# Patient Record
Sex: FEMALE | Race: WHITE
[De-identification: names, ages, dates, MRNs, and addresses within clinical notes are randomized per-mention and may not be internally consistent; named-entity substitution may affect disease eponyms.]

---

## 2018-10-02 ENCOUNTER — HOSPITAL ENCOUNTER (INPATIENT)
Dept: HOSPITAL 47 - 2ORMAIN | Age: 63
LOS: 1 days | Discharge: HOME HEALTH | DRG: 470 | End: 2018-10-03
Attending: ORTHOPAEDIC SURGERY | Admitting: ORTHOPAEDIC SURGERY
Payer: COMMERCIAL

## 2018-10-02 VITALS — BODY MASS INDEX: 29.6 KG/M2

## 2018-10-02 DIAGNOSIS — Z88.8: ICD-10-CM

## 2018-10-02 DIAGNOSIS — Z79.899: ICD-10-CM

## 2018-10-02 DIAGNOSIS — M16.12: Primary | ICD-10-CM

## 2018-10-02 DIAGNOSIS — F17.210: ICD-10-CM

## 2018-10-02 DIAGNOSIS — Z87.01: ICD-10-CM

## 2018-10-02 DIAGNOSIS — D62: ICD-10-CM

## 2018-10-02 DIAGNOSIS — Z91.013: ICD-10-CM

## 2018-10-02 DIAGNOSIS — Z80.9: ICD-10-CM

## 2018-10-02 PROCEDURE — 88300 SURGICAL PATH GROSS: CPT

## 2018-10-02 PROCEDURE — 86900 BLOOD TYPING SEROLOGIC ABO: CPT

## 2018-10-02 PROCEDURE — 80053 COMPREHEN METABOLIC PANEL: CPT

## 2018-10-02 PROCEDURE — 86891 AUTOLOGOUS BLOOD OP SALVAGE: CPT

## 2018-10-02 PROCEDURE — 86901 BLOOD TYPING SEROLOGIC RH(D): CPT

## 2018-10-02 PROCEDURE — 85025 COMPLETE CBC W/AUTO DIFF WBC: CPT

## 2018-10-02 PROCEDURE — 73501 X-RAY EXAM HIP UNI 1 VIEW: CPT

## 2018-10-02 PROCEDURE — 86850 RBC ANTIBODY SCREEN: CPT

## 2018-10-02 PROCEDURE — 0SRB06A REPLACEMENT OF LEFT HIP JOINT WITH OXIDIZED ZIRCONIUM ON POLYETHYLENE SYNTHETIC SUBSTITUTE, UNCEMENTED, OPEN APPROACH: ICD-10-PCS

## 2018-10-02 RX ADMIN — POTASSIUM CHLORIDE SCH MLS: 14.9 INJECTION, SOLUTION INTRAVENOUS at 06:02

## 2018-10-02 RX ADMIN — ASPIRIN 325 MG ORAL TABLET SCH MG: 325 PILL ORAL at 20:40

## 2018-10-02 RX ADMIN — HYDROCODONE BITARTRATE AND ACETAMINOPHEN PRN EACH: 5; 325 TABLET ORAL at 12:15

## 2018-10-02 RX ADMIN — CEFAZOLIN SCH: 330 INJECTION, POWDER, FOR SOLUTION INTRAMUSCULAR; INTRAVENOUS at 13:40

## 2018-10-02 RX ADMIN — HYDROCODONE BITARTRATE AND ACETAMINOPHEN PRN EACH: 5; 325 TABLET ORAL at 17:36

## 2018-10-02 RX ADMIN — ASPIRIN 325 MG ORAL TABLET SCH: 325 PILL ORAL at 13:40

## 2018-10-02 RX ADMIN — CEFAZOLIN SCH MLS/HR: 330 INJECTION, POWDER, FOR SOLUTION INTRAMUSCULAR; INTRAVENOUS at 18:49

## 2018-10-02 RX ADMIN — CEFAZOLIN SCH GM: 10 INJECTION, POWDER, FOR SOLUTION INTRAVENOUS at 15:11

## 2018-10-02 NOTE — FL
Fluoroscopy

 

HISTORY: Left hip arthroplasty

 

38 seconds fluoroscopy time supplied to the referring clinician.  2 intraoperative C-arm images docum
ent the procedure. See dictated report from orthopedic surgery.

## 2018-10-02 NOTE — XR
Limited left hip

 

HISTORY: Left hip arthroplasty

 

2 intraoperative C-arm images document the procedure.

## 2018-10-02 NOTE — P.OP
Date of Procedure: 10/02/18


Preoperative Diagnosis: 


Severe osteoarthritis left hip


Postoperative Diagnosis: 


Severe osteoarthritis left hip


Procedure(s) Performed: 


Left total hip arthroplasty with a direct anterior approach


Implants: 


Smith and nephew Polarstem size 2 standard


Smith & Nephew R3, 3 hole acetabular shell, 48 mm


Smith & Nephew reflection 6.5 mm cancellus screw, 20 mm 2


Smith & Nephew R3, XLPE 20 acetabular liner


Smith & Nephew Oxinium femoral head 32 m, +0


All components were press-fit.


The articulation is Oxinium on polyethylene.


Anesthesia: spinal


Surgeon: Yasmani Salamanca


Assistant #1: Fatuma Mcdonnell


Estimated Blood Loss (ml): 200 (60 mL returned with Cell Saver)


Pathology: other (Femoral head)


Condition: stable


Disposition: PACU


Indications for Procedure: 


After failure of conservative treatment we discussed the surgical and 

nonsurgical treatment options at length.  Patient wishes to proceed with a 

total hip arthroplasty with a direct anterior approach.  Complications specific 

to this procedure were discussed at length, including but not limited to 

infection, leg length discrepancy, dislocation, and nerve injury.  Patient is 

aware of all these complications and informed consent was obtained


Operative Findings: 


The operative findings are consistent with severe osteoarthritis of the left hip


Description of Procedure: 


Patient was seen and evaluated in the preoperative area, consent was reviewed, 

and the surgical site was marked with a skin marker.  Patient was then brought 

to the operating room and given prophylactic antibiotics intravenously.  1 g of 

Tranexamic acid was also given.  A spinal anesthetic was administered by the 

anesthesia department.   The patient was then placed on the [place] table with the 

bony prominences well-padded.  The hip area was then prepped and draped in 

usual sterile fashion.





A universal timeout was then performed, which confirmed the patient's name, 

surgical site, ALLERGIES, and procedure being performed.  Next the incision 

site was located at 1 cm distal and 1 cm lateral to the anterior superior iliac 

spine.  The skin and subcutaneous tissues were sharply incised.  Incision was 

carefully dissected down to the fascia overlying the tensor fascia dilma muscle.

  This fascia was then incised in line with the incision.  Next, using blunt 

finger dissection, the tensor fascia dilma muscle was dissected off its 

investing fascia.  The muscle was then carefully retracted laterally with a 

cobra retractor over the lateral neck of the femur.  Next, the circumflex 

vessels were identified and cauterized using the AquaMantis device.  The 

anterior hip capsule was then exposed.  The capsule was then opened and an 

inverted T fashion.  Cobra retractors were then placed intracapsularly.  The 

proximal femur was then visualized.





The femoral neck was then osteotomized appropriate level above the lesser 

trochanter.  Small amount of traction was placed with the [place] table.  A small 

wedge of bone was then removed from the remaining femoral head.  Next, using a 

corkscrew femoral head was easily removed from the acetabulum.  On gross visual 

inspection, the femoral head had complete loss of articular cartilage in 

multiple periarticular osteophytes.  Attention was then turned to the 

acetabulum.





the acetabulum was exposed and any remaining labrum was excised.  Sequential 

reaming of the acetabulum was performed using fluoroscopic guidance.  When the 

appropriate size was reached, a trial was then placed.  The position and fit of 

the trial was checked with fluoroscopy.  The trial was then removed.  Then, 

using fluoroscopic guidance, the final implant was impacted at 20 of 

anteversion and 40 of abduction, and fully seated in the acetabulum.  2 screws 

were then placed in the acetabulum.  Again fluoroscopy was used to check 

position of the screws.  Next, the liner was then impacted, with a 20 elevated 

liner located in the anterior superior quadrant.  Component locking was 

confirmed.





Attention was then directed to the femur.  With the aid of the [place] table, the 

femur was externally rotated to approximately 130, extended, and abducted 

under the opposite leg.  A side hook was then placed under the proximal femur, 

and the side hook elevator was used to elevate the proximal femur.  Retractors 

were then placed.  A capsular release was performed, as well as a release of 

the conjoined tendon, which afforded excellent visualization of the proximal 

femur.  Next, a box osteotome was used to lateralize the proximal femur.  A 

 was then used to locate the femoral canal.  Sequential broaching 

was then performed with appropriate size which afforded excellent fixation in 

the proximal femur.  A trial was then placed with appropriate head and neck, 

and the hip was gently reduced with the aid of the [place] table.  Fluoroscopy was 

then used to check position of the components, as well as to ensure equal leg 

lengths.  The hip was then gently dislocated and the trials were then removed.  

Final implants were then impacted and the hip was again reduced.  Final 

fluoroscopic x-rays confirmed that the components were in anatomic position, as 

well as equal leg lengths.  The hip was also taken through range of motion, and 

found to be stable.





The hip was then copiously irrigated with antibiotic solution with pulsatile 

lavage.  The hip was then irrigated with Irrisept solution.  The soft tissues 

were then injected with a ropivacaine solution, which consisted of 246.25 mg of 

ropivacaine, 0.5 mg of epinephrine, 30 mg of Toradol, 80 g of clonidine, and 

48.45 mL of sterile water, for a total of 100 mL of fluid injected.  A second 

dose of 1 g of Tranexamic acid was also given.





the fascia was then closed with 2-0 strata fix suture.  The subcutaneous tissue 

was closed with 3-0 Vicryl.  The subcuticular tissue was closed with 3-0 strata 

fix suture.  The skin was then closed with Dermabond glue and a sterile silver 

dressing.  The patient was then transferred to the recovery room in stable 

condition.





The assistant  ALANIS Hartmann was required due to the complexity of surgery, 

and the need for skilled surgical assistant for positioning, draping, exposure, 

retraction, and closure of the wound.

## 2018-10-02 NOTE — XR
EXAMINATION TYPE: XR Hip Limited LT

 

DATE OF EXAM: 10/2/2018

 

COMPARISON: NONE

 

HISTORY: 63-year-old female status post hip surgery, assess surgical alignment

 

TECHNIQUE: AP portable view

 

FINDINGS: 

Image shows placement of left total hip arthroplasty. Both acetabular cup and femoral short stem comp
onents of the prosthesis appear well seated without periprosthetic fracture. Alignment grossly anatom
ic. Scattered foci of soft tissue air relating to recent operation.

 

 

 

IMPRESSION: 

Uncomplicated postoperative appearance left total hip arthroplasty.

## 2018-10-02 NOTE — P.CONS
History of Present Illness





- Reason for Consult


Consult date: 10/02/18


Left hip arthroplasty, medical management


Requesting physician: Yasmani Salamanca





- Chief Complaint


L hip arthroplasty, medical management





- History of Present Illness





63-year-old female with PMH of severe left hip osteoarthritis is admitted at 

[facility] after left total hip arthroplasty with a direct anterior 

approach.  Patient reports having osteoarthritis of the left hip for multiple 

years.  She complains of catching and locking of her hip joint.  She has tried 

hip injections as well as taken naproxen for her pain.


Patient was seen and examined post surgery.  Patient reports minimal hip pain, 

5 out of 10 in severity.  Patient reports numbness of the left lower extremity.

  The numbness begins laterally at the mid thigh downwards towards the knee.  

Patient also reports numbness and tingling of the toes, improving since 

surgery.  Patient reports not urinating or having a bowel movement since 

surgery.  She denies passing gas as well.  She denies any nausea, vomiting, 

fever, cough, chest pain, shortness of breath, changes in appetite or weight.  


Her PCP is Dr. Stevens.





PMH: OA of the L hip


PSH: L foot Sx


ALL: NKDA. Shellfish allergy.


Meds: None


FH: Non contributory


SH: Smokes 1 PPD. Drinks up to 3 beers daily, denies WD symptoms. No illicits.





Review of Systems


All systems: negative





Past Medical History


Past Medical History: Pneumonia


Additional Past Medical History / Comment(s): hemorrhoids, past hx of pneumonia-

none in 30 yrs


History of Any Multi-Drug Resistant Organisms: None Reported


Past Surgical History: Orthopedic Surgery, Tubal Ligation


Additional Past Surgical History / Comment(s): ORIF left foot,


Past Anesthesia/Blood Transfusion Reactions: Motion Sickness


Past Psychological History: No Psychological Hx Reported


Smoking Status: Current every day smoker


Past Alcohol Use History: Occasional


Additional Past Alcohol Use History / Comment(s): smokes 1 PPD for 40 yrs


Past Drug Use History: None Reported





- Past Family History


  ** Mother


Family Medical History: Cancer





  ** Father


History Unknown: Yes





Medications and Allergies


 Home Medications











 Medication  Instructions  Recorded  Confirmed  Type


 


Multivitamins, Thera [Multivitamin 1 tab PO DAILY 09/24/18 09/24/18 History





(formulary)]    


 


Naproxen Sodium 220 mg PO DAILY 09/24/18 09/24/18 History


 


Psyllium(Dose Unknown) 4 cap PO DAILY 09/24/18 10/02/18 History











 Allergies











Allergy/AdvReac Type Severity Reaction Status Date / Time


 


iodine Allergy  Unknown Verified 09/24/18 13:36


 


shellfish derived [Shellfish] Allergy  Nausea & Verified 09/24/18 13:36





   Vomiting &  





   Diarrhea  














Physical Exam


Vitals: 


 Vital Signs











  Temp Pulse Resp BP Pulse Ox


 


 10/02/18 10:45   83   104/73 


 


 10/02/18 10:30   79   104/64 


 


 10/02/18 10:15   84   111/76 


 


 10/02/18 10:00   76   95/62 


 


 10/02/18 09:45  97.3 F L  79  16  107/66  97


 


 10/02/18 09:30   76  16  112/59  94 L


 


 10/02/18 09:17   82  16  112/58  95


 


 10/02/18 09:02   79  16  105/57  99


 


 10/02/18 08:46  97.9 F  87  16  103/61  98


 


 10/02/18 05:55  97.8 F  100  16  136/65  98








 Intake and Output











 10/01/18 10/02/18 10/02/18





 22:59 06:59 14:59


 


Intake Total   1301


 


Output Total   200


 


Balance   1101


 


Intake:   


 


  IV   1301


 


Output:   


 


  Estimated Blood Loss   200


 


Other:   


 


  Weight   73.482 kg














General: [non toxic], [no distress], [appears at stated age]


Derm: [warm], [dry]


Head: [atraumatic], [normocephalic], [symmetric]


Eyes: [EOMI], [no lid lag], [anicteric sclera]


Mouth: [no lip lesion], [mucus membranes moist]


Cardiovascular: [S1S2 reg], [no murmur], [positive DP pulse bilateral], 


Lungs: [CTA bilateral], [no rhonchi, no rales] , [no accessory muscle use]


Abdominal: [soft], [ nontender to palpation], [no guarding], [no appreciable 

organomegaly]


Ext: [no gross muscle atrophy], [no edema], [no contractures]


Neuro: [LLE weakness. Sensation decreased to touch in LLE laterally extending 

to the knee.]


Psych: [Alert], [oriented], [appropriate affect] 





Assessment and Plan


Assessment: 





Assessment and Plan





1. L hip pain: s/p L total hip arthroplasty on 10/2/2018.  Hip x-ray shows 

uncomplicated postoperative appearance of left total hip arthroplasty.  Pain 

management with Norco, and Dilaudid IV when necessary.  Meloxicam daily.  

Aggressive bowel regimen with Senokot-S, Milk of Magnesia.  Cefazolin 2 g IV 

every 8 hours for 2 doses.  Incentive spirometry.  Plans for outpatient PT.  

Follow-up Orthopedic surgery, PT and OT.





2. DVT Prophylaxis: SCD boots only.

## 2018-10-03 VITALS
RESPIRATION RATE: 14 BRPM | TEMPERATURE: 97.9 F | HEART RATE: 97 BPM | SYSTOLIC BLOOD PRESSURE: 107 MMHG | DIASTOLIC BLOOD PRESSURE: 64 MMHG

## 2018-10-03 LAB
ALBUMIN SERPL-MCNC: 2.9 G/DL (ref 3.5–5)
ALP SERPL-CCNC: 58 U/L (ref 38–126)
ALT SERPL-CCNC: 19 U/L (ref 9–52)
ANION GAP SERPL CALC-SCNC: 6 MMOL/L
AST SERPL-CCNC: 26 U/L (ref 14–36)
BASOPHILS # BLD AUTO: 0 K/UL (ref 0–0.2)
BASOPHILS NFR BLD AUTO: 0 %
BUN SERPL-SCNC: 11 MG/DL (ref 7–17)
CALCIUM SPEC-MCNC: 8.7 MG/DL (ref 8.4–10.2)
CHLORIDE SERPL-SCNC: 108 MMOL/L (ref 98–107)
CO2 SERPL-SCNC: 27 MMOL/L (ref 22–30)
EOSINOPHIL # BLD AUTO: 0 K/UL (ref 0–0.7)
EOSINOPHIL NFR BLD AUTO: 0 %
ERYTHROCYTE [DISTWIDTH] IN BLOOD BY AUTOMATED COUNT: 3.66 M/UL (ref 3.8–5.4)
ERYTHROCYTE [DISTWIDTH] IN BLOOD: 12.8 % (ref 11.5–15.5)
GLUCOSE SERPL-MCNC: 93 MG/DL (ref 74–99)
HCT VFR BLD AUTO: 34.4 % (ref 34–46)
HGB BLD-MCNC: 11.2 GM/DL (ref 11.4–16)
LYMPHOCYTES # SPEC AUTO: 1 K/UL (ref 1–4.8)
LYMPHOCYTES NFR SPEC AUTO: 9 %
MCH RBC QN AUTO: 30.7 PG (ref 25–35)
MCHC RBC AUTO-ENTMCNC: 32.7 G/DL (ref 31–37)
MCV RBC AUTO: 93.9 FL (ref 80–100)
MONOCYTES # BLD AUTO: 0.6 K/UL (ref 0–1)
MONOCYTES NFR BLD AUTO: 5 %
NEUTROPHILS # BLD AUTO: 9.1 K/UL (ref 1.3–7.7)
NEUTROPHILS NFR BLD AUTO: 85 %
PLATELET # BLD AUTO: 237 K/UL (ref 150–450)
POTASSIUM SERPL-SCNC: 4.3 MMOL/L (ref 3.5–5.1)
PROT SERPL-MCNC: 5.4 G/DL (ref 6.3–8.2)
SODIUM SERPL-SCNC: 141 MMOL/L (ref 137–145)
WBC # BLD AUTO: 10.7 K/UL (ref 3.8–10.6)

## 2018-10-03 RX ADMIN — CEFAZOLIN SCH GM: 10 INJECTION, POWDER, FOR SOLUTION INTRAVENOUS at 00:01

## 2018-10-03 RX ADMIN — POTASSIUM CHLORIDE SCH: 14.9 INJECTION, SOLUTION INTRAVENOUS at 05:11

## 2018-10-03 RX ADMIN — HYDROCODONE BITARTRATE AND ACETAMINOPHEN PRN EACH: 5; 325 TABLET ORAL at 08:28

## 2018-10-03 RX ADMIN — HYDROCODONE BITARTRATE AND ACETAMINOPHEN PRN EACH: 5; 325 TABLET ORAL at 02:45

## 2018-10-03 RX ADMIN — ASPIRIN 325 MG ORAL TABLET SCH MG: 325 PILL ORAL at 08:28

## 2018-10-03 NOTE — P.DS
Providers


Date of admission: 


10/02/18 05:34





Expected date of discharge: 10/03/18


Attending physician: 


Yasmani Salamanca





Consults: 





 





10/02/18 06:56


Consult Physician Routine 


   Consulting Provider: Terry Vaughan


   Consult Reason/Comments: medical management


   Do you want consulting provider notified?: Yes











Primary care physician: 


Gaby Stevens MD








- Discharge Diagnosis(es)


(1) S/P total hip arthroplasty


Current Visit: Yes   Status: Acute   





(2) Primary osteoarthritis of right hip


Current Visit: Yes   Status: Acute   


Hospital Course: 


This is a 63-year-old female with known history of degenerative arthritis of 

the left hip.  The patient presents for evaluation.  After discussion and 

consideration patient elects to proceed with total hip arthroplasty.  The 

patient is seen preoperatively by Dr. Salamanca and medically cleared for 

surgery by their primary care physician.





Patient is admitted to [facility] on 10/02/2018 for total hip 

arthroplasty.  The procedures performed without complication or sequelae.  The 

patient is doing well postoperatively.  Labs and vital signs are stable on day 

of discharge.





On day of discharge patient's hip incision is healing well.  There is minimal 

erythema.  There is no drainage noted at this time.  There is minimal soft 

tissue swelling to the hip and thigh.  Patient has full foot and ankle motion 

without difficulty or pain.  Neurovascular status to the left lower extremity 

is intact.  Patient is discharged home in good condition. Please see med rec 

for accurate list of home medications.








Plan - Discharge Summary


Discharge Rx Participant: Yes


New Discharge Prescriptions: 


New


   Aspirin 325 mg PO BID #60 tab


   HYDROcodone/APAP 5-325MG [Norco 5-325] 1 - 2 tab PO Q4-6H PRN #84 tab


     PRN Reason: Pain


   Sennosides [Senokot] 1 tab PO BID #60 tablet





No Action


   Naproxen Sodium 220 mg PO DAILY


   Multivitamins, Thera [Multivitamin (formulary)] 1 tab PO DAILY


   Psyllium(Dose Unknown) 4 cap PO DAILY


Discharge Medication List





Multivitamins, Thera [Multivitamin (formulary)] 1 tab PO DAILY 09/24/18 [History

]


Naproxen Sodium 220 mg PO DAILY 09/24/18 [History]


Psyllium(Dose Unknown) 4 cap PO DAILY 09/24/18 [History]


Aspirin 325 mg PO BID #60 tab 10/03/18 [Rx]


HYDROcodone/APAP 5-325MG [Norco 5-325] 1 - 2 tab PO Q4-6H PRN #84 tab 10/03/18 [

Rx]


Sennosides [Senokot] 1 tab PO BID #60 tablet 10/03/18 [Rx]








Follow up Appointment(s)/Referral(s): 


Yasmani Salamanca DO [Doctor of Osteopathic Medicine] - 2 Weeks


Activity/Diet/Wound Care/Special Instructions: 


Weightbearing as tolerated with walker


Leave dressing intact.  Dressing may be removed by home care nurse in 10 days.


May shower with dressing on.


Follow-up with [facility] in 2 weeks, please call with any questions 

or concerns [phone]


Discharge Disposition: HOME WITH HOME HEALTH SERVICES

## 2018-10-03 NOTE — P.PN
Subjective


Progress Note Date: 10/03/18


Principal diagnosis: 





hip pain


Patient is a 63-year-old  female past medical history of tobacco abuse 

who presented for elective total hip arthroplasty.  She underwent a direct 

anterior right total hip arthroplasty on 10/2 without any immediate 

postoperative complications.





Patient seen and examined at bedside.  Her pain is well controlled.  She feels 

like she is, have a bowel movement seen.  No chest pain, shortness breath, 

nausea, or vomiting.  We discussed her slightly acute blood loss anemia.  Told 

her that I do not recommend iron therapy at this point in time but would 

recommend repeat CBC in 1 week.








Objective





- Vital Signs


Vital signs: 


 Vital Signs











Temp  98.2 F   10/03/18 00:25


 


Pulse  85   10/03/18 00:25


 


Resp  15   10/03/18 00:25


 


BP  100/61   10/03/18 00:25


 


Pulse Ox  94 L  10/03/18 00:25








 Intake & Output











 10/02/18 10/03/18 10/03/18





 18:59 06:59 18:59


 


Intake Total 1301 605 


 


Output Total 200  


 


Balance 1101 605 


 


Weight 73.482 kg  


 


Intake:   


 


  IV 1301  


 


  Intake, IV Titration  65 





  Amount   


 


    Sodium Chloride 0.9% 1,  65 





    000 ml @ 65 mls/hr IV .   





    [id number] DANGELO Rx#:[id number]   


 


  Oral  540 


 


Output:   


 


  Estimated Blood Loss 200  


 


Other:   


 


  Voiding Method  Toilet 


 


  # Voids  1 














- Exam


General: non toxic, no distress, appears at stated age


Derm: warm, dry


Head: atraumatic, normocephalic, symmetric


Eyes: EOMI, no lid lag, anicteric sclera


Mouth: no lip lesion, mucus membranes moist


Cardiovascular: S1S2 reg, no murmur, positive posterior tibial pulse bilateral, 


Lungs: CTA bilateral, no rhonchi, no rales , no accessory muscle use


Abdominal: soft,  nontender to palpation, no guarding, no appreciable 

organomegaly


Ext: no gross muscle atrophy, no edema, no contractures


Neuro:  CN II-XI grossly intact, no focal neuro deficits


Psych: Alert, oriented, appropriate affect 











- Labs


CBC & Chem 7: 


 10/03/18 06:28





 10/03/18 06:28


Labs: 


 Abnormal Lab Results - Last 24 Hours (Table)











  10/03/18 10/03/18 Range/Units





  06:28 06:28 


 


WBC  10.7 H   (3.8-10.6)  k/uL


 


RBC  3.66 L   (3.80-5.40)  m/uL


 


Hgb  11.2 L   (11.4-16.0)  gm/dL


 


Neutrophils #  9.1 H   (1.3-7.7)  k/uL


 


Chloride   108 H  ()  mmol/L


 


Total Protein   5.4 L  (6.3-8.2)  g/dL


 


Albumin   2.9 L  (3.5-5.0)  g/dL














Assessment and Plan


Assessment: 


Right direct anterior hip replacement


-Aspirin twice a day per ortho for DVT prophylaxis


-Pain control


-Being set up with home health


-Plan is for discharge today





Acute blood loss anemia


-Repeat CBC in 1 week with results to Dr. Stevens


-Would not recommend oral iron replacement therapy at this point in time but 

would is CBC does not improve





Tobacco abuse


-Nicotine cessation recommended





Medically stable for discharge.

## 2019-12-03 ENCOUNTER — HOSPITAL ENCOUNTER (OUTPATIENT)
Dept: HOSPITAL 47 - LABWHC1 | Age: 64
End: 2019-12-03
Attending: FAMILY MEDICINE
Payer: COMMERCIAL

## 2019-12-03 DIAGNOSIS — Z00.00: Primary | ICD-10-CM

## 2019-12-03 DIAGNOSIS — Z12.4: ICD-10-CM

## 2019-12-03 LAB
ALBUMIN SERPL-MCNC: 4.3 G/DL (ref 3.8–4.9)
ALBUMIN/GLOB SERPL: 2.39 G/DL (ref 1.6–3.17)
ALP SERPL-CCNC: 80 U/L (ref 41–126)
ALT SERPL-CCNC: 22 U/L (ref 8–44)
ANION GAP SERPL CALC-SCNC: 9.5 MMOL/L (ref 4–12)
AST SERPL-CCNC: 22 U/L (ref 13–35)
BASOPHILS # BLD AUTO: 0 K/UL (ref 0–0.2)
BASOPHILS NFR BLD AUTO: 1 %
BUN SERPL-SCNC: 13 MG/DL (ref 9–27)
BUN/CREAT SERPL: 16.25 RATIO (ref 12–20)
CALCIUM SPEC-MCNC: 8.8 MG/DL (ref 8.7–10.3)
CHLORIDE SERPL-SCNC: 108 MMOL/L (ref 96–109)
CHOLEST SERPL-MCNC: 207 MG/DL (ref 0–200)
CO2 SERPL-SCNC: 24.5 MMOL/L (ref 21.6–31.8)
EOSINOPHIL # BLD AUTO: 0.1 K/UL (ref 0–0.7)
EOSINOPHIL NFR BLD AUTO: 1 %
ERYTHROCYTE [DISTWIDTH] IN BLOOD BY AUTOMATED COUNT: 4.57 M/UL (ref 3.8–5.4)
ERYTHROCYTE [DISTWIDTH] IN BLOOD: 12.2 % (ref 11.5–15.5)
GLOBULIN SER CALC-MCNC: 1.8 G/DL (ref 1.6–3.3)
GLUCOSE SERPL-MCNC: 96 MG/DL (ref 70–110)
HCT VFR BLD AUTO: 41.3 % (ref 34–46)
HDLC SERPL-MCNC: 52 MG/DL (ref 40–60)
HGB BLD-MCNC: 14 GM/DL (ref 11.4–16)
LDLC SERPL CALC-MCNC: 111.8 MG/DL (ref 0–131)
LYMPHOCYTES # SPEC AUTO: 1.4 K/UL (ref 1–4.8)
LYMPHOCYTES NFR SPEC AUTO: 24 %
MAGNESIUM SPEC-SCNC: 1.9 MG/DL (ref 1.5–2.4)
MCH RBC QN AUTO: 30.5 PG (ref 25–35)
MCHC RBC AUTO-ENTMCNC: 33.8 G/DL (ref 31–37)
MCV RBC AUTO: 90.5 FL (ref 80–100)
MONOCYTES # BLD AUTO: 0.3 K/UL (ref 0–1)
MONOCYTES NFR BLD AUTO: 5 %
NEUTROPHILS # BLD AUTO: 4 K/UL (ref 1.3–7.7)
NEUTROPHILS NFR BLD AUTO: 67 %
PLATELET # BLD AUTO: 307 K/UL (ref 150–450)
POTASSIUM SERPL-SCNC: 4.3 MMOL/L (ref 3.5–5.5)
PROT SERPL-MCNC: 6.1 G/DL (ref 6.2–8.2)
SODIUM SERPL-SCNC: 142 MMOL/L (ref 135–145)
TRIGL SERPL-MCNC: 216 MG/DL (ref 0–149)
VLDLC SERPL CALC-MCNC: 43.2 MG/DL (ref 5–40)
WBC # BLD AUTO: 5.9 K/UL (ref 3.8–10.6)

## 2019-12-03 PROCEDURE — 84100 ASSAY OF PHOSPHORUS: CPT

## 2019-12-03 PROCEDURE — 80053 COMPREHEN METABOLIC PANEL: CPT

## 2019-12-03 PROCEDURE — 80061 LIPID PANEL: CPT

## 2019-12-03 PROCEDURE — 83735 ASSAY OF MAGNESIUM: CPT

## 2019-12-03 PROCEDURE — 85025 COMPLETE CBC W/AUTO DIFF WBC: CPT

## 2019-12-03 PROCEDURE — 36415 COLL VENOUS BLD VENIPUNCTURE: CPT

## 2019-12-03 PROCEDURE — 82306 VITAMIN D 25 HYDROXY: CPT

## 2020-01-13 ENCOUNTER — HOSPITAL ENCOUNTER (OUTPATIENT)
Dept: HOSPITAL 47 - RADMAMWWP | Age: 65
Discharge: HOME | End: 2020-01-13
Attending: FAMILY MEDICINE
Payer: COMMERCIAL

## 2020-01-13 DIAGNOSIS — Z12.31: Primary | ICD-10-CM

## 2020-01-13 PROCEDURE — 77067 SCR MAMMO BI INCL CAD: CPT

## 2020-01-14 NOTE — MM
Reason for exam: screening  (asymptomatic).

Last mammogram was performed 4 years and 2 months ago.



History:

Patient is postmenopausal and history of other cancer.



Physical Findings:

A clinical breast exam by your physician is recommended on an annual basis and 

results should be correlated with mammographic findings.



MG Screening Mammo w CAD

Bilateral CC and MLO view(s) were taken.

Prior study comparison: November 18, 2015, mammogram, performed at [place].  

January 8, 2013, mammogram, performed at [place].

The breast tissue is heterogeneously dense. This may lower the sensitivity of 

mammography.  Bilateral nodularity.

This finding is changed when compared with previous exams.





ASSESSMENT: Incomplete: need additional imaging evaluation, BI-RAD 0



RECOMMENDATION:

Special view mammogram of both breasts.



If lesion persists on supplemental views, image directed ultrasound is 

recommended.



[facility] will attempt to contact patient to return for supplemental 

views and ultrasound if indicated.

## 2020-01-23 ENCOUNTER — HOSPITAL ENCOUNTER (OUTPATIENT)
Dept: HOSPITAL 47 - RADMAMWWP | Age: 65
Discharge: HOME | End: 2020-01-23
Attending: FAMILY MEDICINE
Payer: COMMERCIAL

## 2020-01-23 DIAGNOSIS — R92.8: Primary | ICD-10-CM

## 2020-01-23 PROCEDURE — 77066 DX MAMMO INCL CAD BI: CPT

## 2020-01-23 NOTE — MM
Reason for exam: additional evaluation requested from abnormal screening.

Last mammogram was performed less than 1 month ago.



History:

Patient is postmenopausal and history of other cancer.

Took hormonal contraceptives for 1 year.



Physical Findings:

Nurse did not find any significant physical abnormalities on exam.



MG Work Up Mamm w CAD BILAT

Bilateral spot compression MLO and ML view(s) were taken.  Spot compression CC 

view(s) were taken of the left breast.

Prior study comparison: January 13, 2020, bilateral MG screening mammo w CAD.  

November 18, 2015, mammogram, performed at [place].

The breast tissue is heterogeneously dense. This may lower the sensitivity of 

mammography.  There is no discrete abnormality.



These results were verbally communicated with the patient and result sheet given 

to the patient on 1/23/20.





ASSESSMENT: Negative, BI-RAD 1



RECOMMENDATION:

Return to routine screening mammogram schedule for both breasts.

## 2020-11-25 ENCOUNTER — HOSPITAL ENCOUNTER (OUTPATIENT)
Dept: HOSPITAL 47 - RADCTMAIN | Age: 65
Discharge: HOME | End: 2020-11-25
Attending: FAMILY MEDICINE
Payer: COMMERCIAL

## 2020-11-25 DIAGNOSIS — R35.0: ICD-10-CM

## 2020-11-25 DIAGNOSIS — K43.9: Primary | ICD-10-CM

## 2020-11-25 DIAGNOSIS — R23.2: ICD-10-CM

## 2020-11-25 PROCEDURE — 74177 CT ABD & PELVIS W/CONTRAST: CPT

## 2020-11-26 NOTE — CT
EXAMINATION TYPE: CT abdomen pelvis w con

 

DATE OF EXAM: 11/25/2020

 

COMPARISON: None

 

HISTORY: hernia

 

CT DLP: 1089.6 mGycm

 

CONTRAST: 

CT scan of the abdomen and pelvis is performed with Oral Contrast and with IV Contrast, patient injec
sanchez with 100 mL of Isovue 300.

 

FINDINGS: 

LUNG BASES-: No visible nodule.  No infiltrate. 

 

LIVER/GB:   No calcified gallstones.  No space occupying hepatic lesion. Biliary tree is of normal ca
liber. 

 

PANCREAS:  No inflammation.  No distinct mass. 

 

SPLEEN:  No splenic enlargement.  No lesion seen. 

 

ADRENALS:  No nodule.  No thickening. 

 

KIDNEYS/BLADDER:  No hydronephrosis.  No nephrolithiasis.  No distinct renal mass.  Urinary bladder g
rossly unremarkable. 

 

BOWEL: Normal appendix.  Normal bowel caliber.  No inflammation.

 

GENITAL ORGANS:  No gross abnormality. 

 

LYMPH NODES:  No greater than 1cm abdominal or pelvic lymph nodes are appreciated.

 

AORTA: No significant abnormality. 

 

OSSEOUS STRUCTURES: Left hip prosthesis noted.

 

OTHER:  No significant additional abnormality is seen. 

 

IMPRESSION: 

1. No distinct abnormality appreciated.

## 2021-04-02 ENCOUNTER — HOSPITAL ENCOUNTER (OUTPATIENT)
Dept: HOSPITAL 47 - RADXRMAIN | Age: 66
Discharge: HOME | End: 2021-04-02
Attending: FAMILY MEDICINE
Payer: COMMERCIAL

## 2021-04-02 DIAGNOSIS — R22.1: Primary | ICD-10-CM

## 2021-04-02 PROCEDURE — 71046 X-RAY EXAM CHEST 2 VIEWS: CPT

## 2021-04-02 NOTE — XR
EXAMINATION TYPE: XR chest 2V

 

DATE OF EXAM: 4/2/2021

 

COMPARISON: NONE

 

HISTORY: R 22.1

 

TECHNIQUE:  Frontal and lateral views of the chest are obtained.

 

FINDINGS:  There is no focal air space opacity, pleural effusion, or pneumothorax seen.  The cardiac 
silhouette size is within normal limits.   The osseous structures are intact, acromioclavicular joint
 arthropathy changes are present. The aorta is dense.

 

IMPRESSION:  No acute cardiopulmonary process.

## 2022-01-05 ENCOUNTER — HOSPITAL ENCOUNTER (OUTPATIENT)
Dept: HOSPITAL 47 - RADMRIMAIN | Age: 67
Discharge: HOME | End: 2022-01-05
Attending: FAMILY MEDICINE
Payer: COMMERCIAL

## 2022-01-05 DIAGNOSIS — G93.89: Primary | ICD-10-CM

## 2022-01-05 PROCEDURE — 70553 MRI BRAIN STEM W/O & W/DYE: CPT

## 2022-01-05 NOTE — MR
EXAMINATION TYPE: MR brain wo/w con

 

DATE OF EXAM: 1/5/2022

 

COMPARISON: NONE

 

HISTORY: Episodes of disorientation, hot flashes, and tremors

 

TECHNIQUE: 

Multiplanar, multisequence images of the brain and brainstem is performed without and with IV contras
t, utilizing 8 mL intravenous Gadavist .

 

FINDINGS: Diffusion weighted images demonstrate no evidence of a recent infarct or other diffusion ab
normality.  There is mild ventricular and sulcal prominence. Occasional scattered focus of T2 hyperin
tensity is seen throughout the white matter bilaterally.The craniocervical junction appears within no
rmal limits.  

 

There is avid enhancing left middle cranial fossa mass involving left anterior aspect of the perimese
ncephalic cistern measuring 4.0 cm AP diameter by 3.7 cm transversely axial measures 11 x 3.9 cm cran
iocaudal dimension coronal image 19. Lesion has T1 hypointensity and slight T2 hyperintensity. There 
is some local mass effect along the left ventral aspect of the superior jori. There is mass effect wi
th splaying of vessels in the left middle cerebral artery distribution. Lesion is favored extra-axial
 in location with dural extension on sagittal image 78 or tail identified. Suprasellar extension with
out sellar or pituitary invasion is felt present. Imaging characteristics favor large meningioma with
 local mass effect. 

 

The dural venous sinuses appear patent. Mild mucosal thickening involving ethmoid sinuses bilaterally
. Normal draining dural venous sinuses. No additional enhancing masses. Globes are intact bilaterally
.

 

IMPRESSION: There is 4.0 cm central left middle cranial fossa avidly enhancing lesion, favor extra-ax
ial mass or neoplasm with strong suspicion for meningioma. Vascular etiology is in differential but f
elt much less likely. Advise neurosurgical referral. Local mass effect is present.

 

A Yellow level critical message alert has been initiated for Danyel Harrison DO via the [employer] Critical Results System on 1/5/2022 8:10 AM.  This message alert has been sent to Danyel Cotto nd, DO via the preferences provided by the clinician for the receipt of Radiology Critical Findings. 
Message ID [id number].

## 2022-02-07 ENCOUNTER — HOSPITAL ENCOUNTER (OUTPATIENT)
Dept: HOSPITAL 47 - RADXRMAIN | Age: 67
Discharge: HOME | End: 2022-02-07
Attending: NEUROLOGICAL SURGERY
Payer: COMMERCIAL

## 2022-02-07 DIAGNOSIS — D32.9: ICD-10-CM

## 2022-02-07 DIAGNOSIS — Z01.818: Primary | ICD-10-CM

## 2022-02-07 PROCEDURE — 71046 X-RAY EXAM CHEST 2 VIEWS: CPT

## 2022-02-07 NOTE — XR
EXAMINATION TYPE: XR chest 2V

 

DATE OF EXAM: 2/7/2022

 

COMPARISON: 4/2/2021

 

TECHNIQUE: PA and lateral views submitted.

 

HISTORY: Preop

 

FINDINGS:

The lungs are clear and  there is no pneumothorax, pleural effusion, or focal pneumonia.  Heart size 
normal. No overt failure. Arthropathy of the shoulders greater on the right. Hypertrophic and degener
ative change of the spine.

 

IMPRESSION: 

1. No acute process.

## 2022-06-01 ENCOUNTER — HOSPITAL ENCOUNTER (OUTPATIENT)
Dept: HOSPITAL 47 - RADMRIMAIN | Age: 67
Discharge: HOME | End: 2022-06-01
Attending: NEUROLOGICAL SURGERY
Payer: COMMERCIAL

## 2022-06-01 DIAGNOSIS — D32.0: ICD-10-CM

## 2022-06-01 DIAGNOSIS — G93.89: ICD-10-CM

## 2022-06-01 DIAGNOSIS — I65.22: Primary | ICD-10-CM

## 2022-06-01 PROCEDURE — 70553 MRI BRAIN STEM W/O & W/DYE: CPT

## 2022-06-02 NOTE — MR
EXAMINATION TYPE: MR brain wo/w con

 

DATE OF EXAM: 6/1/2022

 

COMPARISON: 1/5/2022

 

HISTORY: 66-year-old female D32.9, BENIGN NEOPLASM OF MENINGES, UNSPECIFIED. Partial tumor removal on
 3/1/2022.

 

TECHNIQUE:  Multiplanar, multisequence images of the brain and brainstem were acquired before and aft
er administration of  8 mL IV Gadavist.  Diffusion weighted imaging is performed. 

 

FINDINGS:  

Interval left frontal craniotomy flap. New smooth underlying enhancing dural thickening is likely pos
tsurgical.

 

No suspicious restricted diffusion to suggest an acute infarct.

 

T2/FLAIR weighted sequences show only a few (less than 5 subcortical bright signal foci in both cereb
ral hemispheres), likely on the basis of mild burden of chronic small vessel ischemic disease.

 

There is redemonstration of a avidly enhancing T2 intermediate signal round mass centered along the l
eft parasellar region, encasing and mildly narrowing the cavernous, clinoid, and supraclinoid segment
s of the left ICA.. There also appears to be some partial encasement versus mass effect in anterior d
isplacement of the M1 segment left MCA and secondary vessel narrowing.

 

Redemonstrated prominent draining vein that courses along the superior aspect of the mass and courses
 posteriorly along the inferior aspect of the left thalamus and into the vein of German.

 

Dural tails are demonstrated and the mass shows extension to the apex of the left orbit, left sella, 
and with some mass effect onto the anterior margin of the left cerebral peduncle, left insular lobe, 
and some superior displacement of the left basal ganglia.

 

Interval slight debulking of the mass currently measuring 3.7 cm wide by 4.0 cm AP by 4.3 cm cranioca
udal (axial image 11 and sagittal images 67 and 63). Previously measured 4.3 x 4.4 x 4.2 cm.

 

Minimal 3 mm of rightward midline shift is unchanged.

 

Moderate mucosal thickening ethmoid air cells. Hypoplastic V4 segment left vertebral artery. The glob
es appear intact.

 

 

 

IMPRESSION:

 

1. Interval left frontal craniotomy flap. Interval slight debulking of the large, extra-axial left pa
rasellar mass currently measuring 4.3 x 4.0 x 3.7 cm (versus 4.2 x 4.4 x 4.3 cm, previously).

2. Continued encasement and narrowing of the left internal carotid artery as mentioned above. Additio
corey, similar partial encasement versus mass effect and anterior displacement of the M1 segment left
 MCA.

3. Overall similar local mass affect on to the left basal ganglia and left insular lobe with minimal 
3 mm of rightward midline shift.

4. No acute or evolving infarct.

## 2022-07-15 ENCOUNTER — HOSPITAL ENCOUNTER (OUTPATIENT)
Dept: HOSPITAL 47 - NEUROMAIN | Age: 67
End: 2022-07-15
Attending: NEUROLOGICAL SURGERY
Payer: COMMERCIAL

## 2022-07-15 DIAGNOSIS — Z91.041: ICD-10-CM

## 2022-07-15 DIAGNOSIS — F17.200: ICD-10-CM

## 2022-07-15 DIAGNOSIS — Z91.013: ICD-10-CM

## 2022-07-15 DIAGNOSIS — R56.9: ICD-10-CM

## 2022-07-15 DIAGNOSIS — D32.9: Primary | ICD-10-CM

## 2022-07-15 PROCEDURE — 95819 EEG AWAKE AND ASLEEP: CPT

## 2022-07-15 NOTE — EEG
ELECTROENCEPHALOGRAM REPORT



DATE OF SERVICE:

07/15/2022



PREAMBLE:

This is a 66-year-old female who about 10 years ago noticed having episodes of body

tension, blurred vision and nonresponsiveness.  She could understand people when they

spoke to her; however, she was not able to communicate back.  She had an MRI and it

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  She suffers from short-

term memory loss and some word association at times.  Her episodes have been considered

as seizures.  She currently takes Keppra 500 mg b.i.d., gabapentin.



EEG FINDINGS:

This is a 21-channel digital EEG recorded with video competent, utilizing 10/20

international system with referential and bipolar montages.  Background consists of

well developed, well regulated moderate voltage activity in 10 hertz alpha.  Background

is posterior-dominant and reactive to eye opening and closing.  There is relatively

higher amplitude activity in the mid temporal region consistent with breach rhythm,

probably due to craniotomy defect.  There are intermittent sharp-appearing waves seen

in the left mid temporal region as well, which could be part of the breach rhythm,

although epileptiform nature cannot be ruled out.  Photic driving response was seen

with some flash frequencies.  Some drowsiness was seen with appearance of bilaterally

symmetric theta frequency rhythm; however, deeper stages of sleep were not attained.

No electrographic seizure was recorded.



IMPRESSION:

This is an abnormal EEG due to:

1. Presence of amplitude asymmetry with slightly higher amplitude activity in the left

    temporal region, suggestive of breach rhythm due to previous craniotomy defect.

2. Intermittent sharp-appearing waves seen in the left temporal region which could be

    part of the breach rhythm, although underlying cortical irritability cannot be

    ruled out.  No electrographic seizure was recorded.





MMODL / IJN: [id number] / Job#: [id number]

## 2022-10-28 ENCOUNTER — HOSPITAL ENCOUNTER (OUTPATIENT)
Dept: HOSPITAL 47 - RADMAMWWP | Age: 67
Discharge: HOME | End: 2022-10-28
Attending: FAMILY MEDICINE
Payer: MEDICARE

## 2022-10-28 DIAGNOSIS — Z12.31: Primary | ICD-10-CM

## 2022-10-28 DIAGNOSIS — Z80.3: ICD-10-CM

## 2022-10-28 DIAGNOSIS — Z78.0: ICD-10-CM

## 2022-10-28 PROCEDURE — 77067 SCR MAMMO BI INCL CAD: CPT

## 2022-10-28 PROCEDURE — 77063 BREAST TOMOSYNTHESIS BI: CPT

## 2022-10-31 NOTE — MM
Reason for Exam: Screening  (asymptomatic). 

Last mammogram was performed 2 year(s) and 9 month(s) ago. 





Patient History: 

Menarche at age 18. First Full-Term Pregnancy at age 23. Postmenopausal. Patient used Hormonal

Contraceptives for 1 year.

Sister had breast cancer, age 62. 





Risk Values: 

Sapphire 5 year model risk: 2.9%.

NCI Lifetime model risk: 10.7%.





Prior Study Comparison: 

11/18/2015  Screening Mammogram, [place]. 1/13/2020 Bilateral Screening Mammogram, [facility]. 1/23/2020

Bilateral Diagnostic Mammogram, [facility]. 





Tissue Density: 

The breast tissue is heterogeneously dense. This may lower the sensitivity of mammography.





Findings: 

Analyzed By CAD. 

Pattern appears symmetrical and stable. No significant interval change is evident.

No suspicious groups of microcalcifications, spiculated or lobular masses, architectural distortion

or other secondary signs of malignancy are mammographically apparent. 





Overall Assessment: Benign, BI-RAD 2





Management: 

Screening Mammogram of both breasts in 1 year.

A negative mammogram report should not preclude additional follow up of suspicious palpable

abnormalities.

Patient should continue monthly self breast exam.

A clinical breast exam by your physician is recommended on an annual basis and results should be

correlated with mammographic findings.



Electronically signed and approved by: Luis Pelletier D.O. Radiologis

## 2022-12-05 ENCOUNTER — HOSPITAL ENCOUNTER (OUTPATIENT)
Dept: HOSPITAL 47 - RADMRIMAIN | Age: 67
Discharge: HOME | End: 2022-12-05
Attending: NEUROLOGICAL SURGERY
Payer: MEDICARE

## 2022-12-05 DIAGNOSIS — R56.9: ICD-10-CM

## 2022-12-05 DIAGNOSIS — D32.9: Primary | ICD-10-CM

## 2022-12-05 PROCEDURE — 70553 MRI BRAIN STEM W/O & W/DYE: CPT

## 2022-12-05 NOTE — MR
EXAMINATION TYPE: MR brain wo/w con

 

DATE OF EXAM: 12/5/2022

 

COMPARISON: 6/1/2022

 

HISTORY: 67-year-old female D32.9, R56.9, Follow-up comparison post partial tumor resection.

 

TECHNIQUE:  Multiplanar, multisequence images of the brain and brainstem were acquired before and aft
er administration of  8 mL IV Gadavist.  Diffusion weighted imaging is performed. 

 

FINDINGS:  

DWI sequence shows no suspicious restricted diffusion to suggest a recent infarct.

 

Redemonstrated large T2 hyperintense mass arising from the lesser wing of the sphenoid and left sella
 turcica. This measures up to 3.9 x 3.9 cm in up to 4.1 cm craniocaudal. These measurements are not s
ignificantly changed from prior. There remains mild regional mass effect similar to prior exam. No hy
drocephalus or herniation. Similar encasement of the cavernous, clinoid, and supraclinoid left ICA an
d left carotid bifurcation.

 

Similar tracer millimeters of rightward midline shift.

 

Postsurgical changes of previous left frontal craniotomy flap.

 

No hydrocephalus.

 

T2/FLAIR weighted sequences show minimal scattered T2 bright white matter foci particularly in the felix
bcortical region of the cerebral hemispheres, left and 5 on each side.

 

Midline structures demonstrate normal morphology.  The craniocervical junction is normal. 

 

Post contrast images demonstrate no other evidence of pathologic enhancement.  Dural venous sinuses a
re patent.

 

Scattered mild mucosal thickening ethmoid air cells. Globes appear intact.

 

 

IMPRESSION:

Previous left frontal craniotomy flap with underlying avidly enhancing meningioma involving the lesse
r wing of the left sphenoid and left sella turcica. This remains unchanged, measuring up to 4.1 cm wi
th similar encasement of the left ICA and left carotid terminus and with similar mild regional mass e
ffect. Trace 3 mm of rightward midline shift is also unchanged.

## 2023-06-01 ENCOUNTER — HOSPITAL ENCOUNTER (OUTPATIENT)
Dept: HOSPITAL 47 - RADMRIMAIN | Age: 68
Discharge: HOME | End: 2023-06-01
Attending: NEUROLOGICAL SURGERY
Payer: MEDICARE

## 2023-06-01 DIAGNOSIS — D32.9: Primary | ICD-10-CM

## 2023-06-01 PROCEDURE — 70551 MRI BRAIN STEM W/O DYE: CPT

## 2023-06-02 NOTE — MR
EXAMINATION TYPE: MR brain wo con

 

DATE OF EXAM: 6/1/2023

 

COMPARISON: 12/5/2022

 

HISTORY: Follow-up on benign brain tumor since brain surgery 3-1-2022, headaches

 

CONTRAST:  Performed utilizing 0 mL intravenous Gadavist gadolinium contrast.  

 

TECHNIQUE: Multiplanar, multiecho imaging on a 3.0 Kimberly magnet is performed through the brain.  Stud
y is performed within 24 hours of arrival to the hospital.

 

There is a mass in the left suprasellar space. This is slightly heterogenous and nearly isointense on
 T2 sequences with cortex. This is somewhat hyperintense on the inversion recovery sequences. Vascula
r flow voids are evident within. This extends and towards the inferior left basal ganglia. Mild mass 
effect on the inferior third ventricle with displacement towards the right is evident. There is some 
mild compression of the lateral ventricle. No temporal horn dilatation or hydrocephalus evident. Mass
 appears to encase the left carotid siphon and extends to the distal internal carotid artery bifurcat
ion. Left middle cerebral artery branches are poorly visualized. The one segment signal void is prese
nt.

 

This currently is estimated to measure 3.5 x 3.3 cm. Previous comparable measurement 3.5 x 3.3 cm.

 

The craniovertebral junction is normal.  The pituitary is normal.  There is displacement of the infer
ior left optic chiasm anterior and towards the right.

 

Diffusion-weighted imaging is performed.  No abnormal hyperintensity is present to suggest an acute i
ntracranial infarct or acute ischemic change. 

 

Ventricles and sulci are otherwise appropriate for the patient age.

 

 

IMPRESSIONS:

1. Extra-axial left suprasellar mass stable in size and appearance comparison with 2022. This encases
 the left internal carotid artery.

## 2023-11-13 ENCOUNTER — HOSPITAL ENCOUNTER (OUTPATIENT)
Dept: HOSPITAL 47 - RADMAMWWP | Age: 68
Discharge: HOME | End: 2023-11-13
Attending: FAMILY MEDICINE
Payer: MEDICARE

## 2023-11-13 DIAGNOSIS — Z78.0: ICD-10-CM

## 2023-11-13 DIAGNOSIS — Z12.31: Primary | ICD-10-CM

## 2023-11-13 PROCEDURE — 77067 SCR MAMMO BI INCL CAD: CPT

## 2023-11-13 PROCEDURE — 77063 BREAST TOMOSYNTHESIS BI: CPT

## 2023-11-14 NOTE — MM
Reason for Exam: Screening  (asymptomatic). 

Last mammogram was performed 1 year(s) and 1 month(s) ago. 





Patient History: 

Menarche at age 18. First Full-Term Pregnancy at age 23. Postmenopausal. Patient used Hormonal

Contraceptives for 1 year.

Sister had ovarian cancer, age 62. 





Risk Values: 

Sapphire 5 year model risk: 1.4%.

NCI Lifetime model risk: 4.9%.





Prior Study Comparison: 

1/13/2020 Bilateral Screening Mammogram, [facility]. 1/23/2020 Bilateral Diagnostic Mammogram, [facility].

10/28/2022 Bilateral MG 3D screening mammo w/cad, [facility]. 





Tissue Density: 

There are scattered fibroglandular densities.





Findings: 

Analyzed By CAD. 

There is no suspicious group of microcalcifications or new suspicious mass in either breast. 





Overall Assessment: Negative, BI-RAD 1





Management: 

Screening Mammogram of both breasts in 1 year.

.



Patient should continue monthly self-breast exams.  A clinical breast exam by your physician is

recommended on an annual basis.

This exam should not preclude additional follow-up of suspicious palpable abnormalities.



Note on Sapphire scores and lifetime risk:

1. A Sapphire score greater than 3% is considered moderate risk. If this is the case, consider

specialist referral to assess eligibility for a risk reducing agent.

2. If overall lifetime risk for the development of breast cancer is 20% or higher, the patient may

qualify for future screening with alternating mammogram and breast MRI.



Electronically signed and approved by: Leopold M. Fregoli, M.D. Radiologis

## 2024-01-12 ENCOUNTER — HOSPITAL ENCOUNTER (OUTPATIENT)
Dept: HOSPITAL 47 - RADMRIMAIN | Age: 69
Discharge: HOME | End: 2024-01-12
Attending: NEUROLOGICAL SURGERY
Payer: MEDICARE

## 2024-01-12 DIAGNOSIS — D32.9: ICD-10-CM

## 2024-01-12 DIAGNOSIS — G93.89: Primary | ICD-10-CM

## 2024-01-12 PROCEDURE — 70551 MRI BRAIN STEM W/O DYE: CPT

## 2024-01-15 NOTE — MR
EXAMINATION TYPE: MR brain wo con

 

DATE OF EXAM: 1/12/2024

 

COMPARISON: 6/1/2023

 

HISTORY: Benign neoplasm of the meninges

 

CONTRAST:  Performed utilizing 0 mL intravenous Gadobutrol gadolinium contrast.  

 

TECHNIQUE: Multiplanar, multiecho imaging on a 3.0 Kimberly magnet is performed through the brain.  Stud
y is performed within 24 hours of arrival to the hospital.

 

The craniovertebral junction is normal.

 

There is a large mass left of the suprasellar space. This is estimated to measure 3.4 cm AP by 3.2 cm
 transverse by 3.4 cm craniocaudal.  Deviation of the optic chiasm is evident. Displacement of middle
 cerebral artery and branches is evident. There is some component compression and displacement of the
 left cerebral peduncle. There is some mild white matter change within the left temporal lobe. Some m
ild prominence of the left temporal horn lateral ventricle is present on the left. Left lateral ventr
icular prominence however is not evident. There is some deviation of the third ventricle towards the 
right. Fourth ventricle is normal.

 

Diffusion-weighted imaging is performed.  No abnormal hyperintensity is present to suggest an acute i
ntracranial infarct or acute ischemic change.

 

No white matter change within the cerebral peduncle or left basal ganglion is evident. No suspicious 
signal change within the periventricular white matter. There is a solitary subcortical white matter c
hange in the right frontal lobe.

 

COMPARISON: This study is compared with 6/1/2023. No significant interval change is evident. Mass and
 mass effect appears stable. The punctate white matter change in the right frontal lobe is stable. Th
e apparent encasement of the left internal carotid artery appears persistent. No suspicious new campos
es. 

 

 

IMPRESSION:

1. Stable-appearing extra-axial mass left suprasellar cistern can be compatible with a stable appeari
ng meningioma.

## 2024-06-27 ENCOUNTER — HOSPITAL ENCOUNTER (OUTPATIENT)
Dept: HOSPITAL 47 - RADMRIMAIN | Age: 69
Discharge: HOME | End: 2024-06-27
Attending: NEUROLOGICAL SURGERY
Payer: MEDICARE

## 2024-06-27 DIAGNOSIS — D32.9: Primary | ICD-10-CM

## 2024-06-27 DIAGNOSIS — R56.9: ICD-10-CM

## 2024-06-27 PROCEDURE — 70553 MRI BRAIN STEM W/O & W/DYE: CPT

## 2024-06-27 NOTE — MR
EXAMINATION TYPE: MR brain wo/w con

 

DATE OF EXAM: 6/27/2024 11:06 AM

 

CLINICAL INDICATION:Female, 68 years old with history of Z87.891 PERSONAL HX NICOTINE DEPENDANCE; [facility]
, Follow-up comparison post tumor resection, Pt states only part of the tumor was removed

 

COMPARISON: 1/12/2024

 

TECHNIQUE: Multi planar, multi sequence imaging was performed through the brain including: T1, T2, In
version recovery, susceptibility weighted imaging and gradient echo imaging and Diffusion weighted im
aging. The patient was then given intravenous contrast and multi planar, T1 fat-saturation images wer
e obtained.

IV Contrast: 8 cc Gadavist

 

FINDINGS: 

 

 

Extra-axial mass measuring 35 x 32 x 32 mm previously 34 x 32 x 32 mm which is similar given differen
mary carmen in slice selection and technique. This extends into the and around the left carotid artery/cavern
ous sinuses and terminates near/adjacent to left Meckel's cave. Vasogenic edema within the left tempo
ral lobe is similar to prior. Postcontrast enhancement of this mass heterogenous. No other masses are
 identified.

The gray-white junctions, ventricular system, basal cisterns appear unremarkable.  Diffusion-weighted
 imaging shows no evidence of restricted diffusion to suggest acute/subacute infarct. Intracranial ar
terial flow voids are maintained. Midline structures show no abnormality. Scattered foci of high T2 s
ignal intensity are seen within the periventricular white matter. The susceptibility weighted images 
do not reveal any evidence for micro-hemorrhage. After additional administration of gadolinium, no ab
normal enhancement is seen.

 

The bone marrow signal is postsurgical changes to the left cranium. No abnormal postcontrast enhancem
ent within the surgical bed.

Paranasal sinuses and mastoid air cells: No significant paranasal sinus disease.

Visualized orbits: Orbital contents are intact.

 

IMPRESSION:

1. Overall similar size and appearance left extra-axial mass near the cavernous sinus with extension 
into the cavernous sinus and around the left terminal carotid artery given differences in slice selec
tion No evidence acute/subacute infarct, .

 

2. Nonspecific white matter changes, likely related to small vessel ischemic disease.

## 2024-07-24 ENCOUNTER — HOSPITAL ENCOUNTER (OUTPATIENT)
Dept: HOSPITAL 47 - LABWHC1 | Age: 69
Discharge: HOME | End: 2024-07-24
Attending: PSYCHIATRY & NEUROLOGY
Payer: MEDICARE

## 2024-07-24 DIAGNOSIS — Z51.81: Primary | ICD-10-CM

## 2024-07-24 DIAGNOSIS — Z79.811: ICD-10-CM

## 2024-07-24 LAB
ANION GAP SERPL CALC-SCNC: 10.9 MMOL/L (ref 4–12)
BASOPHILS # BLD AUTO: 0.06 X 10*3/UL (ref 0–0.1)
BASOPHILS NFR BLD AUTO: 0.7 %
BUN SERPL-SCNC: 8.5 MG/DL (ref 9–27)
BUN/CREAT SERPL: 10.62 RATIO (ref 12–20)
CALCIUM SPEC-MCNC: 9.3 MG/DL (ref 8.7–10.3)
CHLORIDE SERPL-SCNC: 104 MMOL/L (ref 96–109)
CO2 SERPL-SCNC: 26.1 MMOL/L (ref 21.6–31.8)
EOSINOPHIL # BLD AUTO: 0.06 X 10*3/UL (ref 0.04–0.35)
EOSINOPHIL NFR BLD AUTO: 0.7 %
ERYTHROCYTE [DISTWIDTH] IN BLOOD BY AUTOMATED COUNT: 4.77 X 10*6/UL (ref 4.1–5.2)
ERYTHROCYTE [DISTWIDTH] IN BLOOD: 12.8 % (ref 11.5–14.5)
GLUCOSE SERPL-MCNC: 101 MG/DL (ref 70–110)
HCT VFR BLD AUTO: 41.9 % (ref 37.2–46.3)
HGB BLD-MCNC: 13.7 G/DL (ref 12–15)
IMM GRANULOCYTES BLD QL AUTO: 0.8 %
LYMPHOCYTES # SPEC AUTO: 1.69 X 10*3/UL (ref 0.9–5)
LYMPHOCYTES NFR SPEC AUTO: 19.6 %
MCH RBC QN AUTO: 28.7 PG (ref 27–32)
MCHC RBC AUTO-ENTMCNC: 32.7 G/DL (ref 32–37)
MCV RBC AUTO: 87.8 FL (ref 80–97)
MONOCYTES # BLD AUTO: 0.57 X 10*3/UL (ref 0.2–1)
MONOCYTES NFR BLD AUTO: 6.6 %
NEUTROPHILS # BLD AUTO: 6.17 X 10*3/UL (ref 1.8–7.7)
NEUTROPHILS NFR BLD AUTO: 71.6 %
NRBC BLD AUTO-RTO: 0 X 10*3/UL (ref 0–0.01)
PLATELET # BLD AUTO: 363 X 10*3/UL (ref 140–440)
POTASSIUM SERPL-SCNC: 4 MMOL/L (ref 3.5–5.5)
SODIUM SERPL-SCNC: 141 MMOL/L (ref 135–145)
WBC # BLD AUTO: 8.62 X 10*3/UL (ref 4.5–10)

## 2024-07-24 PROCEDURE — 36415 COLL VENOUS BLD VENIPUNCTURE: CPT

## 2024-07-24 PROCEDURE — 85025 COMPLETE CBC W/AUTO DIFF WBC: CPT

## 2024-07-24 PROCEDURE — 80048 BASIC METABOLIC PNL TOTAL CA: CPT

## 2025-01-13 ENCOUNTER — HOSPITAL ENCOUNTER (OUTPATIENT)
Dept: HOSPITAL 47 - RADMAMWWP | Age: 70
Discharge: HOME | End: 2025-01-13
Attending: FAMILY MEDICINE
Payer: MEDICARE

## 2025-01-13 DIAGNOSIS — Z12.31: Primary | ICD-10-CM

## 2025-01-13 DIAGNOSIS — R92.343: ICD-10-CM

## 2025-01-13 DIAGNOSIS — Z78.0: ICD-10-CM

## 2025-01-13 PROCEDURE — 77067 SCR MAMMO BI INCL CAD: CPT

## 2025-01-13 PROCEDURE — 77063 BREAST TOMOSYNTHESIS BI: CPT

## 2025-01-13 NOTE — MM
Reason for Exam: Screening  (asymptomatic). 

Last mammogram was performed 1 year(s) and 2 month(s) ago. 





Patient History: 

Menarche at age 18. First Full-Term Pregnancy at age 23. Postmenopausal. Patient used Hormonal

Contraceptives for 1 year.

Sister had ovarian cancer, age 62. 





Risk Values: 

Sapphire 5 year model risk: 1.4%.

NCI Lifetime model risk: 4.8%.





Prior Study Comparison: 

1/23/2020 Bilateral Diagnostic Mammogram, [facility]. 10/28/2022 Bilateral MG 3D screening mammo w/cad,

[facility]. 11/13/2023 Bilateral MG 3D screening mammo w/cad, [facility]. 





Tissue Density: 

The breasts are extremely dense, which lowers the sensitivity of mammography.

Analyzed By CAD. 





Overall Assessment: Benign, BI-RAD 2





Management: 

Screening Mammogram of both breasts in 1 year.

Electronically signed and approved by: Juan Manuel Owens M.D.

## 2025-07-03 ENCOUNTER — HOSPITAL ENCOUNTER (OUTPATIENT)
Dept: HOSPITAL 47 - LABWHC1 | Age: 70
Discharge: HOME | End: 2025-07-03
Attending: PSYCHIATRY & NEUROLOGY
Payer: MEDICARE

## 2025-07-03 DIAGNOSIS — G40.909: Primary | ICD-10-CM

## 2025-07-03 PROCEDURE — 36415 COLL VENOUS BLD VENIPUNCTURE: CPT

## 2025-07-03 PROCEDURE — 80175 DRUG SCREEN QUAN LAMOTRIGINE: CPT

## 2025-07-03 PROCEDURE — 80235 DRUG ASSAY LACOSAMIDE: CPT

## 2025-07-04 LAB — LAMOTRIGINE SERPL-MCNC: 9.5 UG/ML (ref 2–15)
